# Patient Record
Sex: MALE
[De-identification: names, ages, dates, MRNs, and addresses within clinical notes are randomized per-mention and may not be internally consistent; named-entity substitution may affect disease eponyms.]

---

## 2022-05-23 ENCOUNTER — HOSPITAL ENCOUNTER (EMERGENCY)
Dept: HOSPITAL 97 - ER | Age: 22
LOS: 1 days | Discharge: NURSING, REHAB OR LONG-TERM CARE | End: 2022-05-24
Payer: SELF-PAY

## 2022-05-23 DIAGNOSIS — R10.31: Primary | ICD-10-CM

## 2022-05-23 DIAGNOSIS — Z20.822: ICD-10-CM

## 2022-05-23 LAB
ALBUMIN SERPL BCP-MCNC: 4.2 G/DL (ref 3.4–5)
ALP SERPL-CCNC: 57 U/L (ref 45–117)
ALT SERPL W P-5'-P-CCNC: 32 U/L (ref 12–78)
AST SERPL W P-5'-P-CCNC: 32 U/L (ref 15–37)
BUN BLD-MCNC: 14 MG/DL (ref 7–18)
GLUCOSE SERPLBLD-MCNC: 83 MG/DL (ref 74–106)
HCT VFR BLD CALC: 40.3 % (ref 39.6–49)
LIPASE SERPL-CCNC: 52 U/L (ref 73–393)
LYMPHOCYTES # SPEC AUTO: 2.3 K/UL (ref 0.7–4.9)
PMV BLD: 7.3 FL (ref 7.6–11.3)
POTASSIUM SERPL-SCNC: 2.7 MMOL/L (ref 3.5–5.1)
RBC # BLD: 4.79 M/UL (ref 4.33–5.43)

## 2022-05-23 PROCEDURE — 83690 ASSAY OF LIPASE: CPT

## 2022-05-23 PROCEDURE — 74177 CT ABD & PELVIS W/CONTRAST: CPT

## 2022-05-23 PROCEDURE — 96365 THER/PROPH/DIAG IV INF INIT: CPT

## 2022-05-23 PROCEDURE — 36415 COLL VENOUS BLD VENIPUNCTURE: CPT

## 2022-05-23 PROCEDURE — 96366 THER/PROPH/DIAG IV INF ADDON: CPT

## 2022-05-23 PROCEDURE — 96361 HYDRATE IV INFUSION ADD-ON: CPT

## 2022-05-23 PROCEDURE — 99285 EMERGENCY DEPT VISIT HI MDM: CPT

## 2022-05-23 PROCEDURE — 85025 COMPLETE CBC W/AUTO DIFF WBC: CPT

## 2022-05-23 PROCEDURE — 96375 TX/PRO/DX INJ NEW DRUG ADDON: CPT

## 2022-05-23 PROCEDURE — 81015 MICROSCOPIC EXAM OF URINE: CPT

## 2022-05-23 PROCEDURE — 81003 URINALYSIS AUTO W/O SCOPE: CPT

## 2022-05-23 PROCEDURE — 80053 COMPREHEN METABOLIC PANEL: CPT

## 2022-05-24 VITALS — TEMPERATURE: 98.5 F

## 2022-05-24 VITALS — SYSTOLIC BLOOD PRESSURE: 112 MMHG | OXYGEN SATURATION: 99 % | DIASTOLIC BLOOD PRESSURE: 63 MMHG

## 2022-05-24 NOTE — RAD REPORT
EXAM DESCRIPTION:  CT - Abdomen   Pelvis W Contrast - 5/24/2022 5:43 am

 

CLINICAL HISTORY:  22 years, Male, Abdominal pain, acute, nonlocalized, neutropenic

 

COMPARISON:  None.

 

TECHNIQUE:  Contrast-enhanced images of the abdomen and pelvis were performed utilizing 5 mm slice th
ickness at 5 mm interval reconstruction from the lung bases to the ischial tuberosities after the adm
inistration IV contrast.

In addition multiplanar reformats in the coronal and sagittal plane were obtained and reviewed.

This exam was performed according to our departmental dose-optimization protocol, which includes auto
mated exposure control, adjustment of the mA and/or kV according to patient size and/or use of iterat
shayy reconstruction technique.

 

FINDINGS:  The lung bases demonstrate to be clear.

The liver, gallbladder, pancreas, spleen and adrenal glands demonstrate to be unremarkable, no focal 
lesions are noted.

The kidneys demonstrate normal uptake of contrast media. No evidence for nephrolithiasis and/or hydro
nephrosis.

Grossly the unopacified stomach, small bowel and large bowel demonstrate to be within normal limits. 
There is minimal amount of oral contrast within the stomach in the right lower quadrant area There is
 no evidence for bowel dilatation/or free air.   The appendix was difficult to visualize although no 
significant inflammatory changes are seen within the right lower quadrant.

The urinary bladder demonstrate to be unremarkable.   The prostate gland is normal.   The aorta demon
strate to be normal.   There is no retroperitoneal lymphadenopathy. There is no evidence for ascites/
or significant abnormal fluid collections. The rest of the soft tissue and bony structures are within
 normal limits.

 

IMPRESSION:  No acute intra-abdominal process.

The appendix was difficult to visualize although no significant inflammatory changes are seen within 
the right lower quadrant.

 

Electronically signed by:   John Giles MD   5/24/2022 12:12 AM CDT Workstation: [id number]

 

 

Due to temporary technical issues with the PACS/Fluency reporting system, reports are being signed by
 the in house radiologists without review as a courtesy to insure prompt reporting. The interpreting 
radiologist is fully responsible for the content of the report.

## 2022-05-24 NOTE — EDPHYS
Physician Documentation                                                                           

 [facility]                                                                 

Name: Yonny Sutton                                                                       

Age: 22 yrs                                                                                       

Sex: Male                                                                                         

: 2000                                                                                   

MRN: [id number]                                                                                   

Arrival Date: 2022                                                                          

Time: 21:15                                                                                       

Account#: [id number]                                                                            

Bed 6                                                                                             

Private MD:                                                                                       

ED Physician Bayron Golden                                                                       

HPI:                                                                                              

                                                                                             

21:55 This 22 yrs old  Male presents to ER via Ambulatory with complaints of          cp  

      Abdominal Pain.                                                                             

21:55 The patient presents with abdominal pain in the epigastric area, mid abdomen. Onset:    cp  

      The symptoms/episode began/occurred today, around 1500. The symptoms radiate to             

                                                                                                  

Historical:                                                                                       

- Allergies:                                                                                      

21:33 No Known Allergies;                                                                     tw5 

- Home Meds:                                                                                      

21:33 None [Active];                                                                          tw5 

- PMHx:                                                                                           

:33 None;                                                                                   tw5 

- PSHx:                                                                                           

21:33 None;                                                                                   tw5 

                                                                                                  

- Immunization history:: Flu vaccine is not up to date.                                           

- Social history:: Smoking status: Patient denies any tobacco usage or history of.                

                                                                                                  

                                                                                                  

ROS:                                                                                              

22:00 Constitutional: Negative for body aches, chills, fever, poor PO intake.                 cp  

22:00 Eyes: Negative for injury, pain, redness, and discharge.                                cp  

22:00 ENT: Negative for drainage from ear(s), ear pain, sore throat, difficulty swallowing,       

      difficulty handling secretions.                                                             

22:00 Cardiovascular: Negative for chest pain, palpitations.                                      

22:00 Respiratory: Negative for cough, shortness of breath, wheezing.                             

22:00 Abdomen/GI: Positive for abdominal pain, nausea, vomiting, Negative for diarrhea,           

      constipation.                                                                               

22:00 Back: Positive for radiated pain, of the right low back.                                    

22:00 : Negative for urinary symptoms, hematuria, testicular pain                               

22:00 Neuro: Negative for altered mental status, headache, weakness.                              

22:00 All other systems are negative.                                                             

                                                                                                  

Exam:                                                                                             

22:05 Constitutional: The patient appears in no acute distress, alert, awake, non-toxic, well cp  

      developed, well nourished, uncomfortable.                                                   

22:05 Head/Face:  Normocephalic, atraumatic.                                                  cp  

22:05 Eyes: Periorbital structures: appear normal, Conjunctiva: normal, no exudate, no            

      injection, Sclera: no appreciated abnormality, Lids and lashes: appear normal,              

      bilaterally.                                                                                

22:05 ENT: External ear(s): are unremarkable, Nose: is normal, Mouth: Lips: moist, Oral           

      mucosa: moist, Posterior pharynx: Airway: no evidence of obstruction, patent.               

22:05 Chest/axilla: Inspection: normal, Palpation: is normal, no crepitus, no tenderness.         

22:05 Cardiovascular: Rate: normal, Rhythm: regular.                                              

22:05 Respiratory: the patient does not display signs of respiratory distress,  Respirations:     

      normal, no use of accessory muscles, no retractions, labored breathing, is not present,     

      Breath sounds: are clear throughout, no decreased breath sounds.                            

22:05 Abdomen/GI: Inspection: abdomen appears normal, Bowel sounds: active, all quadrants,        

      Palpation: soft, in all quadrants, moderate abdominal tenderness, in the umbilical          

      area, right upper quadrant and right lower quadrant, rebound tenderness, is not             

      appreciated, voluntary guarding, is elicited in the umbilical area, right upper             

      quadrant and right lower quadrant.                                                          

22:05 Back: pain, that is moderate, of the  right low back.                                       

                                                                                                  

Vital Signs:                                                                                      

21:30  / 81; Pulse 77; Resp 18; Temp 98.5; Pulse Ox 100% ; Weight 58.97 kg; Height 5    tw5 

      ft. 8 in. (175 cm); Pain 10/10;                                                             

22:32  / 80; Pulse 81; Resp 17 S; Pulse Ox 100% on R/A;                                 lg3 

                                                                                             

00:09  / 90; Pulse 85; Resp 16; Pulse Ox 100% on R/A;                                   lg3 

00:53  / 83; Pulse 91; Resp 16; Pulse Ox 100% on R/A;                                   lg3 

02:38  / 63; Pulse 94; Resp 18 S; Pulse Ox 99% on R/A;                                  as6 

03:20  / 61; Pulse 88; Resp 18 S; Temp 98.4(O); Pulse Ox 98% on R/A;                    as6 

                                                                                             

21:30 Body Mass Index 19.25 (58.97 kg, 175 cm)                                                tw5 

                                                                                                  

MDM:                                                                                              

                                                                                             

21:40 Patient medically screened.                                                             cp  

22:00 Differential diagnosis: appendicitis, cholecystitis, Cholelithiasis, diverticulitis,    cp  

      pancreatitis, Pyelonephritis, Testicular Torsion, Ureterolithiasis, urinary tract           

      infection.                                                                                  

                                                                                             

00:30 Data reviewed: vital signs, nurses notes, lab test result(s), radiologic studies, CT    cp  

      scan.                                                                                       

01:42 Physician consultation: was contacted at 01:42, regarding regarding transfer, patient's cp  

      condition, spoke with DR Mann, general surgery, who will consult on patient and          

      requests transfer to services of hospitalist.                                               

                                                                                                  

                                                                                             

21:35 Order name: CBC with Diff; Complete Time: 23:14                                         tw5 

                                                                                             

00:26 Interpretation: Normal except: WBC 13.6; MPV 7.3; SAMPSON% 73.8; NEUT A 10.0.                 

                                                                                             

21:35 Order name: CMP; Complete Time: 23:14                                                   tw 

                                                                                             

00:26 Interpretation: Normal except: K 2.7.                                                     

                                                                                             

21:35 Order name: Lipase; Complete Time: 23:14                                                tw 

                                                                                             

21:35 Order name: Urine Microscopic Only; Complete Time: 01:41                                tw 

                                                                                             

01:48 Interpretation: Reviewed.                                                                 

                                                                                             

00:08 Order name: Urine Dipstick-Ancillary; Complete Time: 00:25                              EDMS

                                                                                             

00:25 Interpretation: Normal except: UBLD Trace-intact.                                         

                                                                                             

00:52 Order name: COVID-19 SARS RT PCR (Document "Date of Onset" if Symptomatic); Complete    as6 

      Time: 03:25                                                                                 

                                                                                             

21:35 Order name: CT Abd/Pelvis - IV Contrast Only                                            tw5 

                                                                                             

21:35 Order name: IV Saline Lock; Complete Time: 22:06                                        tw5 

                                                                                             

21:35 Order name: Labs collected and sent; Complete Time: 22:06                               tw 

                                                                                             

21:35 Order name: Urine Dipstick-Ancillary (obtain specimen); Complete Time: 00:08            [facility] 

                                                                                                  

Administered Medications:                                                                         

                                                                                             

22:15 Drug: Zofran (Ondansetron) 4 mg Route: IVP; Site: right antecubital;                    lg3 

22:15 Follow up: Response: No adverse reaction                                                lg3 

22:15 Drug: NS 0.9% 1000 ml Route: IV; Rate: 1 bolus; Site: right antecubital;                lg3 

23:17 Follow up: Response: No adverse reaction; IV Intake: 1000ml                             3 

                                                                                             

03:41 Follow up: Response: No adverse reaction; IV Status: Completed infusion; IV Intake:     as6 

      1000ml                                                                                      

                                                                                             

22:16 Drug: morphine 4 mg Route: IVP; Site: right antecubital;                                lg3 

22:16 Follow up: Response: No adverse reaction                                                lg3 

                                                                                             

00:08 Drug: Potassium Chloride 20 mEq Route: IV; Rate: calculated rate; Site: right           lg3 

      antecubital;                                                                                

00:43 Follow up: Response: No adverse reaction; IV Intake: 100ml                              lg3 

03:41 Follow up: Response: No adverse reaction; IV Status: Completed infusion; IV Intake:     as6 

      100ml                                                                                       

00:08 Drug: NS 0.9% 1000 ml Route: IV; Rate: 500 ml/hr; Site: right antecubital;              lg3 

00:43 Follow up: Response: No adverse reaction; IV Status: Completed infusion; IV Intake:     lg3 

      1000ml                                                                                      

01:00 Drug: Dilaudid (HYDROmorphone) 1 mg Route: IVP; Site: right antecubital;                as6 

03:41 Follow up: Response: No adverse reaction; RASS: Alert and Calm (0)                      as6 

01:00 Drug: Zofran (Ondansetron) 4 mg Route: IVP; Site: right antecubital;                    as6 

03:41 Follow up: Response: No adverse reaction                                                as6 

01:00 Drug: NS 0.9% 1000 ml Route: IV; Rate: 125 ml/hr; Site: right antecubital;              as6 

03:42 Follow up: Response: No adverse reaction; IV Status: Completed infusion; IV Intake:     as6 

      1000ml                                                                                      

02:35 Drug: Zosyn (piperacillin-tazobactam) 3.375 grams Route: IVPB; Infused Over: 60 mins;   as6 

      Site: right antecubital;                                                                    

03:46 Follow up: Response: No adverse reaction; IV Status: Completed infusion; IV Intake:     as6 

      100ml                                                                                       

02:35 Drug: Potassium Effervescent Tablet 50 mEq Route: PO;                                   as6 

03:47 Follow up: Response: No adverse reaction                                                as6 

03:45 Drug: Dilaudid (HYDROmorphone) 1 mg Route: IVP; Site: right antecubital;                as6 

03:47 Follow up: Response: No adverse reaction; RASS: Alert and Calm (0)                      as6 

                                                                                                  

                                                                                                  

Disposition:                                                                                      

04:03 Co-signature as Attending Physician, Bayron Golden MD I agree with the assessment and   kdr 

      plan of care.                                                                               

                                                                                                  

Disposition Summary:                                                                              

05/24/22 00:40                                                                                    

Transfer Ordered                                                                                  

      Transfer Location: Other Acute Care Facility                                            cp  

      Reason: Higher level of care                                                            cp  

      Condition: Stable                                                                       cp  

      Problem: new                                                                            cp  

      Symptoms: have improved                                                                 cp  

      Accepting Physician: DR Huddleston(22 03:48)                                           as6 

      Diagnosis                                                                                   

        - Lower abdominal pain, unspecified - RLQ                                             cp  

      Forms:                                                                                      

        - Medication Reconciliation Form                                                      cp  

        - SBAR form                                                                           cp  

Signatures:                                                                                       

Dispatcher [employer]                           EDMS                                                 

Bayron Golden MD MD   [facility]                                                  

Ramu Bryant PA                         PA   cp                                                   

Faina Schmitt, RN                       RN   lg3                                                  

Ivone Rinaldi                                tw5                                                  

Adarsh Rojas RN                      RN   as6                                                  

                                                                                                  

Corrections: (The following items were deleted from the chart)                                    

02:01 00:40 Doctor cp                                                                         cp  

03:48 02:01 DR Huddleston cp                                                                       as6 

                                                                                                  

**************************************************************************************************

## 2022-05-24 NOTE — ER
Nurse's Notes                                                                                     

 [facility]                                                                 

Name: Yonny Sutton                                                                       

Age: 22 yrs                                                                                       

Sex: Male                                                                                         

: 2000                                                                                   

MRN: [id number]                                                                                   

Arrival Date: 2022                                                                          

Time: 21:15                                                                                       

Account#: [id number]                                                                            

Bed 6                                                                                             

Private MD:                                                                                       

Diagnosis: Lower abdominal pain, unspecified-RLQ                                                  

                                                                                                  

Presentation:                                                                                     

                                                                                             

21:30 Chief complaint: Patient is primarily Lithuanian speaking. "I feel pain and burning in my  tw5 

      stomach. It is only in my stomach it doesn't go towards my back.". Coronavirus screen:      

      Vaccine status: Patient reports being unvaccinated. Ebola Screen: Patient negative for      

      fever greater than or equal to 101.5 degrees Fahrenheit, and additional compatible          

      Ebola Virus Disease symptoms Patient denies exposure to infectious person. Patient          

      denies travel to an Ebola-affected area in the 21 days before illness onset. Initial        

      Sepsis Screen: Does the patient meet any 2 criteria? No. Patient's initial sepsis           

      screen is negative. Does the patient have a suspected source of infection? Yes: Acute       

      abdominal pain. Risk Assessment: Do you want to hurt yourself or someone else? Patient      

      reports no desire to harm self or others. Onset of symptoms was May 23, 2022 at 15:00.      

21:30 Acuity: VAN 3                                                                           tw5 

21:30 Method Of Arrival: Ambulatory                                                           tw5 

                                                                                                  

Triage Assessment:                                                                                

21:33 General: Appears uncomfortable, Behavior is calm, cooperative, appropriate for age.     tw5 

      Pain: Complains of pain in abdomen Pain currently is 10 out of 10 on a pain scale. GI:      

      Reports nausea.                                                                             

                                                                                                  

Historical:                                                                                       

- Allergies:                                                                                      

21:33 No Known Allergies;                                                                     tw5 

- Home Meds:                                                                                      

21:33 None [Active];                                                                          tw5 

- PMHx:                                                                                           

21:33 None;                                                                                   tw5 

- PSHx:                                                                                           

21:33 None;                                                                                   tw5 

                                                                                                  

- Immunization history:: Flu vaccine is not up to date.                                           

- Social history:: Smoking status: Patient denies any tobacco usage or history of.                

                                                                                                  

                                                                                                  

Screenin:16 Abuse screen: Denies threats or abuse. Denies injuries from another. Nutritional        lg3 

      screening: No deficits noted. Tuberculosis screening: No symptoms or risk factors           

      identified. Fall Risk None identified.                                                      

                                                                                                  

Assessment:                                                                                       

22:16 General: Appears in no apparent distress. uncomfortable, Behavior is calm, cooperative. lg3 

      Pain: Complains of pain in epigastric area and right lower quadrant. Neuro: No deficits     

      noted. Valdovinos Agitation-Sedation Scale (RASS): 0 - Alert and Calm Level of                

      Consciousness is awake, alert, obeys commands, Oriented to person, place, time,             

      situation. Cardiovascular: No deficits noted. Denies chest pain, shortness of breath.       

      Respiratory: No deficits noted. Airway is patent Trachea midline Respiratory effort is      

      even, unlabored, Respiratory pattern is regular, symmetrical. GI: Abdomen is flat,          

      non-distended, Bowel sounds present X 4 quads. Abd is soft X 4 quads Abdomen is tender      

      to palpation Reports lower abdominal pain, epigastric pain. : No deficits noted. No       

      signs and/or symptoms were reported regarding the genitourinary system. EENT: No            

      deficits noted. No signs and/or symptoms were reported regarding the EENT system. Derm:     

      No deficits noted. No signs and/or symptoms reported regarding the dermatologic system.     

      Skin is intact, is healthy with good turgor, Skin is dry, Skin is pink, warm \T\ dry.       

      Musculoskeletal: No deficits noted. No signs and/or symptoms reported regarding the         

      musculoskeletal system.                                                                     

                                                                                             

00:09 Reassessment: Patient appears in no apparent distress at this time. No changes from     lg3 

      previously documented assessment. Patient and/or family updated on plan of care and         

      expected duration. Pain level reassessed. Patient is alert, oriented x 3, equal             

      unlabored respirations, skin warm/dry/pink.                                                 

00:52 Reassessment: Patient appears in no apparent distress at this time. No changes from     lg3 

      previously documented assessment. Patient and/or family updated on plan of care and         

      expected duration. Pain level reassessed. Patient is alert, oriented x 3, equal             

      unlabored respirations, skin warm/dry/pink.                                                 

                                                                                                  

Vital Signs:                                                                                      

                                                                                             

21:30  / 81; Pulse 77; Resp 18; Temp 98.5; Pulse Ox 100% ; Weight 58.97 kg; Height 5    tw5 

      ft. 8 in. (175 cm); Pain 10/10;                                                             

22:32  / 80; Pulse 81; Resp 17 S; Pulse Ox 100% on R/A;                                 lg3 

                                                                                             

00:09  / 90; Pulse 85; Resp 16; Pulse Ox 100% on R/A;                                   lg3 

00:53  / 83; Pulse 91; Resp 16; Pulse Ox 100% on R/A;                                   lg3 

02:38  / 63; Pulse 94; Resp 18 S; Pulse Ox 99% on R/A;                                  as6 

03:20  / 61; Pulse 88; Resp 18 S; Temp 98.4(O); Pulse Ox 98% on R/A;                    as6 

                                                                                             

21:30 Body Mass Index 19.25 (58.97 kg, 175 cm)                                                tw5 

                                                                                                  

ED Course:                                                                                        

                                                                                             

21:15 Patient arrived in ED.                                                                  ag3 

21:19 Ramu Bryant PA is PHCP.                                                                cp  

21:19 Bayron Golden MD is Attending Physician.                                              cp  

21:33 Triage completed.                                                                       tw5 

21:33 Arm band placed on right wrist.                                                         tw5 

21:55 Faina Schmitt, RN is Primary Nurse.                                                     lg3 

22:06 CBC with Diff Sent.                                                                     lg3 

22:06 CMP Sent.                                                                               lg3 

22:06 Lipase Sent.                                                                            lg3 

22:06 Urine Microscopic Only Sent.                                                            lg3 

22:06 Inserted saline lock: 20 gauge in right antecubital area, using aseptic technique.      lg3 

      Blood collected.                                                                            

22:16 Patient has correct armband on for positive identification. Bed in low position. Call   lg3 

      light in reach. Side rails up X 1. Client placed on continuous cardiac and pulse            

      oximetry monitoring. NIBP monitoring applied. Door closed. Noise minimized. Warm            

      blanket given. Family accompanied patient.                                                  

23:38 CT Abd/Pelvis - IV Contrast Only In Process Unspecified.                                EDMS

                                                                                             

01:15 initiated a transfer with Elma from [facility].                       [facility] 

02:26 administrative approval given by Elma Forbes/ patient has been accepted to [address]/ Dr. Huddleston accepted the patient in transfer/report to be called to        

      [phone].                                                                               

03:47 No provider procedures requiring assistance completed. Patient transferred, IV remains  as6 

      in place.                                                                                   

                                                                                                  

Administered Medications:                                                                         

                                                                                             

22:15 Drug: Zofran (Ondansetron) 4 mg Route: IVP; Site: right antecubital;                    lg3 

22:15 Follow up: Response: No adverse reaction                                                lg3 

22:15 Drug: NS 0.9% 1000 ml Route: IV; Rate: 1 bolus; Site: right antecubital;                lg3 

23:17 Follow up: Response: No adverse reaction; IV Intake: 1000ml                             3 

                                                                                             

03:41 Follow up: Response: No adverse reaction; IV Status: Completed infusion; IV Intake:     as6 

      1000ml                                                                                      

                                                                                             

22:16 Drug: morphine 4 mg Route: IVP; Site: right antecubital;                                lg3 

22:16 Follow up: Response: No adverse reaction                                                3 

                                                                                             

00:08 Drug: Potassium Chloride 20 mEq Route: IV; Rate: calculated rate; Site: right           lg3 

      antecubital;                                                                                

00:43 Follow up: Response: No adverse reaction; IV Intake: 100ml                              lg3 

03:41 Follow up: Response: No adverse reaction; IV Status: Completed infusion; IV Intake:     as6 

      100ml                                                                                       

00:08 Drug: NS 0.9% 1000 ml Route: IV; Rate: 500 ml/hr; Site: right antecubital;              lg3 

00:43 Follow up: Response: No adverse reaction; IV Status: Completed infusion; IV Intake:     lg3 

      1000ml                                                                                      

01:00 Drug: Dilaudid (HYDROmorphone) 1 mg Route: IVP; Site: right antecubital;                as6 

03:41 Follow up: Response: No adverse reaction; RASS: Alert and Calm (0)                      as6 

01:00 Drug: Zofran (Ondansetron) 4 mg Route: IVP; Site: right antecubital;                    as6 

03:41 Follow up: Response: No adverse reaction                                                as6 

01:00 Drug: NS 0.9% 1000 ml Route: IV; Rate: 125 ml/hr; Site: right antecubital;              as6 

03:42 Follow up: Response: No adverse reaction; IV Status: Completed infusion; IV Intake:     as6 

      1000ml                                                                                      

02:35 Drug: Zosyn (piperacillin-tazobactam) 3.375 grams Route: IVPB; Infused Over: 60 mins;   as6 

      Site: right antecubital;                                                                    

03:46 Follow up: Response: No adverse reaction; IV Status: Completed infusion; IV Intake:     as6 

      100ml                                                                                       

02:35 Drug: Potassium Effervescent Tablet 50 mEq Route: PO;                                   as6 

03:47 Follow up: Response: No adverse reaction                                                as6 

03:45 Drug: Dilaudid (HYDROmorphone) 1 mg Route: IVP; Site: right antecubital;                as6 

03:47 Follow up: Response: No adverse reaction; RASS: Alert and Calm (0)                      as6 

                                                                                                  

                                                                                                  

Medication:                                                                                       

                                                                                             

22:16 VIS not applicable for this client.                                                     lg3 

                                                                                                  

Intake:                                                                                           

23:17 IV: 1000ml; Total: 1000ml.                                                              lg3 

                                                                                             

00:43 IV: 100ml; Total: 1100ml.                                                               lg3 

00:43 IV: 1000ml; Total: 2100ml.                                                              lg3 

03:41 IV: 1000ml; Total: 3100ml.                                                              as6 

03:41 IV: 100ml; Total: 3200ml.                                                               as6 

03:42 IV: 1000ml; Total: 4200ml.                                                              as6 

03:46 IV: 100ml; Total: 4300ml.                                                               as6 

                                                                                                  

Outcome:                                                                                          

00:40 ER care complete, transfer ordered by MD.                                               cp  

03:47 Transferred by ground EMS to [facility], Transfer form completed.    as6 

03:47 Condition: stable                                                                           

03:47 Instructed on the need for admit.                                                           

03:48 Patient left the ED.                                                                    as6 

                                                                                                  

Signatures:                                                                                       

Dispatcher [employer]                           EDMS                                                 

Ramu Bryant PA PA cp [place], MyKena                            mw2                                                  

Juli Koch                                 ag3                                                  

Faina Schmitt, RN                       RN   lg3                                                  

Ivone Rinaldi                                tw5                                                  

Adarsh Rojas RN                      RN   as6                                                  

                                                                                                  

**************************************************************************************************